# Patient Record
Sex: FEMALE | Race: WHITE | Employment: FULL TIME | URBAN - METROPOLITAN AREA
[De-identification: names, ages, dates, MRNs, and addresses within clinical notes are randomized per-mention and may not be internally consistent; named-entity substitution may affect disease eponyms.]

---

## 2017-01-19 ENCOUNTER — OFFICE VISIT (OUTPATIENT)
Dept: NEPHROLOGY | Age: 37
End: 2017-01-19

## 2017-01-19 VITALS — WEIGHT: 141.8 LBS | DIASTOLIC BLOOD PRESSURE: 78 MMHG | SYSTOLIC BLOOD PRESSURE: 118 MMHG

## 2017-01-19 DIAGNOSIS — E87.6 HYPOKALEMIA: Primary | ICD-10-CM

## 2017-01-19 DIAGNOSIS — E83.42 HYPOMAGNESEMIA: ICD-10-CM

## 2017-01-19 PROCEDURE — 99214 OFFICE O/P EST MOD 30 MIN: CPT | Performed by: INTERNAL MEDICINE

## 2017-01-19 PROCEDURE — G8428 CUR MEDS NOT DOCUMENT: HCPCS | Performed by: INTERNAL MEDICINE

## 2017-01-19 PROCEDURE — G8484 FLU IMMUNIZE NO ADMIN: HCPCS | Performed by: INTERNAL MEDICINE

## 2017-01-19 PROCEDURE — G8599 NO ASA/ANTIPLAT THER USE RNG: HCPCS | Performed by: INTERNAL MEDICINE

## 2017-01-19 PROCEDURE — 1036F TOBACCO NON-USER: CPT | Performed by: INTERNAL MEDICINE

## 2017-01-19 PROCEDURE — G8421 BMI NOT CALCULATED: HCPCS | Performed by: INTERNAL MEDICINE

## 2017-01-19 RX ORDER — PANTOPRAZOLE SODIUM 20 MG/1
40 TABLET, DELAYED RELEASE ORAL 2 TIMES DAILY
COMMUNITY
Start: 2017-01-12 | End: 2018-07-10

## 2017-01-20 ENCOUNTER — TELEPHONE (OUTPATIENT)
Dept: NEPHROLOGY | Age: 37
End: 2017-01-20

## 2017-01-30 ENCOUNTER — TELEPHONE (OUTPATIENT)
Dept: NEPHROLOGY | Age: 37
End: 2017-01-30

## 2017-01-31 LAB
BASOPHILS ABSOLUTE: NORMAL /ΜL
BASOPHILS RELATIVE PERCENT: NORMAL %
BUN BLDV-MCNC: 12 MG/DL
CALCIUM SERPL-MCNC: 8.9 MG/DL
CHLORIDE BLD-SCNC: 104 MMOL/L
CO2: 23 MMOL/L
CREAT SERPL-MCNC: 0.83 MG/DL
EOSINOPHILS ABSOLUTE: NORMAL /ΜL
EOSINOPHILS RELATIVE PERCENT: NORMAL %
GFR CALCULATED: >60
GLUCOSE BLD-MCNC: 97 MG/DL
HCT VFR BLD CALC: 37.4 % (ref 36–46)
HEMOGLOBIN: 12.5 G/DL (ref 12–16)
LYMPHOCYTES ABSOLUTE: NORMAL /ΜL
LYMPHOCYTES RELATIVE PERCENT: NORMAL %
MAGNESIUM: 1.9 MG/DL
MCH RBC QN AUTO: NORMAL PG
MCHC RBC AUTO-ENTMCNC: NORMAL G/DL
MCV RBC AUTO: NORMAL FL
MONOCYTES ABSOLUTE: NORMAL /ΜL
MONOCYTES RELATIVE PERCENT: NORMAL %
NEUTROPHILS ABSOLUTE: NORMAL /ΜL
NEUTROPHILS RELATIVE PERCENT: NORMAL %
PLATELET # BLD: 223 K/ΜL
PMV BLD AUTO: NORMAL FL
POTASSIUM SERPL-SCNC: 3 MMOL/L
RBC # BLD: 4.23 10^6/ΜL
SODIUM BLD-SCNC: 139 MMOL/L
WBC # BLD: 5.9 10^3/ML

## 2017-02-01 ENCOUNTER — TELEPHONE (OUTPATIENT)
Dept: NEPHROLOGY | Age: 37
End: 2017-02-01

## 2017-02-01 DIAGNOSIS — E87.6 HYPOKALEMIA: Primary | ICD-10-CM

## 2017-02-03 LAB — POTASSIUM (K+): 3.6

## 2017-02-06 ENCOUNTER — TELEPHONE (OUTPATIENT)
Dept: NEPHROLOGY | Age: 37
End: 2017-02-06

## 2017-02-06 DIAGNOSIS — E83.42 HYPOMAGNESEMIA: Primary | ICD-10-CM

## 2017-02-06 DIAGNOSIS — E87.6 CHRONIC HYPOKALEMIA: ICD-10-CM

## 2017-02-27 LAB
BUN BLDV-MCNC: 17 MG/DL
CALCIUM SERPL-MCNC: 9.1 MG/DL
CHLORIDE BLD-SCNC: 107 MMOL/L
CO2: 21 MMOL/L
CREAT SERPL-MCNC: 0.67 MG/DL
GFR CALCULATED: 60
GLUCOSE BLD-MCNC: 94 MG/DL
POTASSIUM SERPL-SCNC: 4.7 MMOL/L
SODIUM BLD-SCNC: 138 MMOL/L

## 2017-02-28 DIAGNOSIS — I10 ESSENTIAL HYPERTENSION: ICD-10-CM

## 2017-02-28 DIAGNOSIS — E83.42 HYPOMAGNESEMIA: Primary | ICD-10-CM

## 2017-02-28 DIAGNOSIS — E87.6 HYPOKALEMIA: ICD-10-CM

## 2017-02-28 RX ORDER — SPIRONOLACTONE 25 MG/1
12.5 TABLET ORAL DAILY
Qty: 30 TABLET | Refills: 3 | Status: CANCELLED | OUTPATIENT
Start: 2017-02-28

## 2017-03-06 DIAGNOSIS — I10 ESSENTIAL HYPERTENSION: ICD-10-CM

## 2017-03-06 LAB — POTASSIUM (K+): 4.9

## 2017-03-06 RX ORDER — SPIRONOLACTONE 25 MG/1
TABLET ORAL
Qty: 30 TABLET | Refills: 3 | OUTPATIENT
Start: 2017-03-06 | End: 2018-03-13

## 2017-03-07 ENCOUNTER — TELEPHONE (OUTPATIENT)
Dept: NEPHROLOGY | Age: 37
End: 2017-03-07

## 2017-03-07 DIAGNOSIS — E87.6 HYPOKALEMIA: Primary | ICD-10-CM

## 2017-03-07 LAB
BUN BLDV-MCNC: 12 MG/DL
CALCIUM SERPL-MCNC: 8.4 MG/DL
CHLORIDE BLD-SCNC: 108 MMOL/L
CO2: 23 MMOL/L
CREAT SERPL-MCNC: 0.73 MG/DL
GFR CALCULATED: >60
GLUCOSE BLD-MCNC: 106 MG/DL
MAGNESIUM: 1.8 MG/DL
POTASSIUM SERPL-SCNC: 4.9 MMOL/L
SODIUM BLD-SCNC: 140 MMOL/L

## 2017-03-13 LAB — POTASSIUM (K+): 4.8

## 2017-03-15 ENCOUNTER — TELEPHONE (OUTPATIENT)
Dept: NEPHROLOGY | Age: 37
End: 2017-03-15

## 2017-03-15 DIAGNOSIS — E83.42 HYPOMAGNESEMIA: ICD-10-CM

## 2017-03-15 DIAGNOSIS — E87.6 HYPOKALEMIA: ICD-10-CM

## 2017-03-20 LAB — POTASSIUM (K+): 3.6

## 2017-03-21 ENCOUNTER — TELEPHONE (OUTPATIENT)
Dept: NEPHROLOGY | Age: 37
End: 2017-03-21

## 2017-03-21 DIAGNOSIS — E87.6 HYPOKALEMIA: Primary | ICD-10-CM

## 2017-03-24 LAB
BUN BLDV-MCNC: 19 MG/DL
CALCIUM SERPL-MCNC: 8.7 MG/DL
CHLORIDE BLD-SCNC: 108 MMOL/L
CO2: 23 MMOL/L
CREAT SERPL-MCNC: 0.89 MG/DL
GFR CALCULATED: >60
GLUCOSE BLD-MCNC: 107 MG/DL
MAGNESIUM: 1.9 MG/DL
POTASSIUM SERPL-SCNC: 3.6 MMOL/L
SODIUM BLD-SCNC: 141 MMOL/L

## 2017-03-27 ENCOUNTER — TELEPHONE (OUTPATIENT)
Dept: NEPHROLOGY | Age: 37
End: 2017-03-27

## 2017-03-27 DIAGNOSIS — E87.6 HYPOKALEMIA: ICD-10-CM

## 2017-03-27 DIAGNOSIS — E83.42 HYPOMAGNESEMIA: ICD-10-CM

## 2017-04-07 ENCOUNTER — TELEPHONE (OUTPATIENT)
Dept: NEPHROLOGY | Age: 37
End: 2017-04-07

## 2017-04-07 LAB
BUN BLDV-MCNC: 17 MG/DL
CALCIUM SERPL-MCNC: 8.9 MG/DL
CHLORIDE BLD-SCNC: 105 MMOL/L
CO2: 21 MMOL/L
CREAT SERPL-MCNC: 0.89 MG/DL
GFR CALCULATED: >60
GLUCOSE BLD-MCNC: 144 MG/DL
MAGNESIUM: 1.9 MG/DL
POTASSIUM SERPL-SCNC: 3.4 MMOL/L
SODIUM BLD-SCNC: 141 MMOL/L

## 2017-04-10 ENCOUNTER — TELEPHONE (OUTPATIENT)
Dept: NEPHROLOGY | Age: 37
End: 2017-04-10

## 2017-04-10 DIAGNOSIS — E83.42 HYPOMAGNESEMIA: Primary | ICD-10-CM

## 2017-04-10 DIAGNOSIS — E87.6 HYPOKALEMIA: ICD-10-CM

## 2017-04-10 RX ORDER — SERTRALINE HYDROCHLORIDE 100 MG/1
100 TABLET, FILM COATED ORAL DAILY
COMMUNITY
End: 2017-11-14 | Stop reason: ALTCHOICE

## 2017-04-14 LAB — POTASSIUM (K+): 3.6

## 2017-04-20 ENCOUNTER — OFFICE VISIT (OUTPATIENT)
Dept: NEPHROLOGY | Age: 37
End: 2017-04-20

## 2017-04-20 VITALS
WEIGHT: 173.2 LBS | SYSTOLIC BLOOD PRESSURE: 122 MMHG | DIASTOLIC BLOOD PRESSURE: 78 MMHG | HEART RATE: 94 BPM | OXYGEN SATURATION: 96 %

## 2017-04-20 DIAGNOSIS — E87.6 HYPOKALEMIA: Primary | ICD-10-CM

## 2017-04-20 DIAGNOSIS — E83.42 HYPOMAGNESEMIA: ICD-10-CM

## 2017-04-20 DIAGNOSIS — R19.7 DIARRHEA, UNSPECIFIED TYPE: ICD-10-CM

## 2017-04-20 PROBLEM — G71.3: Status: ACTIVE | Noted: 2017-04-20

## 2017-04-20 PROCEDURE — G8421 BMI NOT CALCULATED: HCPCS | Performed by: INTERNAL MEDICINE

## 2017-04-20 PROCEDURE — G8599 NO ASA/ANTIPLAT THER USE RNG: HCPCS | Performed by: INTERNAL MEDICINE

## 2017-04-20 PROCEDURE — 99213 OFFICE O/P EST LOW 20 MIN: CPT | Performed by: INTERNAL MEDICINE

## 2017-04-20 PROCEDURE — G8428 CUR MEDS NOT DOCUMENT: HCPCS | Performed by: INTERNAL MEDICINE

## 2017-04-20 PROCEDURE — 1036F TOBACCO NON-USER: CPT | Performed by: INTERNAL MEDICINE

## 2017-04-26 ENCOUNTER — TELEPHONE (OUTPATIENT)
Dept: NEPHROLOGY | Age: 37
End: 2017-04-26

## 2017-06-01 ENCOUNTER — TELEPHONE (OUTPATIENT)
Dept: NEPHROLOGY | Age: 37
End: 2017-06-01

## 2017-06-15 LAB
BUN BLDV-MCNC: 13 MG/DL
CALCIUM SERPL-MCNC: 8.8 MG/DL
CHLORIDE BLD-SCNC: 108 MMOL/L
CO2: 23 MMOL/L
CREAT SERPL-MCNC: 0.76 MG/DL
GFR CALCULATED: >60
GLUCOSE BLD-MCNC: 90 MG/DL
MAGNESIUM: 2 MG/DL
POTASSIUM SERPL-SCNC: 3.7 MMOL/L
SODIUM BLD-SCNC: 140 MMOL/L

## 2017-08-09 ENCOUNTER — OFFICE VISIT (OUTPATIENT)
Dept: NEPHROLOGY | Age: 37
End: 2017-08-09
Payer: MEDICARE

## 2017-08-09 VITALS
OXYGEN SATURATION: 94 % | SYSTOLIC BLOOD PRESSURE: 114 MMHG | WEIGHT: 180 LBS | DIASTOLIC BLOOD PRESSURE: 72 MMHG | HEART RATE: 123 BPM

## 2017-08-09 DIAGNOSIS — E83.42 HYPOMAGNESEMIA: ICD-10-CM

## 2017-08-09 DIAGNOSIS — E87.6 HYPOKALEMIA: Primary | ICD-10-CM

## 2017-08-09 LAB
BUN BLDV-MCNC: 13 MG/DL
CALCIUM SERPL-MCNC: 8.7 MG/DL
CHLORIDE BLD-SCNC: 105 MMOL/L
CO2: 19 MMOL/L
CREAT SERPL-MCNC: 0.98 MG/DL
GFR CALCULATED: >60
GLUCOSE BLD-MCNC: 144 MG/DL
MAGNESIUM: 1.8 MG/DL
PHOSPHORUS: 1.9 MG/DL
POTASSIUM SERPL-SCNC: 3.9 MMOL/L
SODIUM BLD-SCNC: 135 MMOL/L

## 2017-08-09 PROCEDURE — 99213 OFFICE O/P EST LOW 20 MIN: CPT | Performed by: INTERNAL MEDICINE

## 2017-08-09 PROCEDURE — G8599 NO ASA/ANTIPLAT THER USE RNG: HCPCS | Performed by: INTERNAL MEDICINE

## 2017-08-09 PROCEDURE — 1036F TOBACCO NON-USER: CPT | Performed by: INTERNAL MEDICINE

## 2017-08-09 PROCEDURE — G8421 BMI NOT CALCULATED: HCPCS | Performed by: INTERNAL MEDICINE

## 2017-08-09 PROCEDURE — G8427 DOCREV CUR MEDS BY ELIG CLIN: HCPCS | Performed by: INTERNAL MEDICINE

## 2017-08-09 RX ORDER — CLONAZEPAM 0.5 MG/1
0.5 TABLET ORAL 3 TIMES DAILY PRN
COMMUNITY
Start: 2017-07-18

## 2017-08-09 RX ORDER — SULFAMETHOXAZOLE AND TRIMETHOPRIM 800; 160 MG/1; MG/1
1 TABLET ORAL DAILY PRN
COMMUNITY
Start: 2017-07-21 | End: 2018-07-10 | Stop reason: ALTCHOICE

## 2017-08-09 RX ORDER — PREDNISONE 20 MG/1
60 TABLET ORAL
COMMUNITY
Start: 2017-07-18 | End: 2017-11-14 | Stop reason: CLARIF

## 2017-08-09 RX ORDER — DULOXETIN HYDROCHLORIDE 60 MG/1
60 CAPSULE, DELAYED RELEASE ORAL 2 TIMES DAILY
COMMUNITY

## 2017-08-10 ENCOUNTER — TELEPHONE (OUTPATIENT)
Dept: NEPHROLOGY | Age: 37
End: 2017-08-10

## 2017-08-16 ENCOUNTER — TELEPHONE (OUTPATIENT)
Dept: NEPHROLOGY | Age: 37
End: 2017-08-16

## 2017-08-16 DIAGNOSIS — E87.6 HYPOKALEMIA: Primary | ICD-10-CM

## 2017-08-25 ENCOUNTER — TELEPHONE (OUTPATIENT)
Dept: NEPHROLOGY | Age: 37
End: 2017-08-25

## 2017-10-20 LAB
BUN BLDV-MCNC: 20 MG/DL
CALCIUM SERPL-MCNC: 8.9 MG/DL
CHLORIDE BLD-SCNC: 108 MMOL/L
CO2: 22 MMOL/L
CREAT SERPL-MCNC: 0.81 MG/DL
GFR CALCULATED: >60
GLUCOSE BLD-MCNC: 92 MG/DL
POTASSIUM SERPL-SCNC: 4 MMOL/L
SODIUM BLD-SCNC: 141 MMOL/L

## 2017-10-23 ENCOUNTER — TELEPHONE (OUTPATIENT)
Dept: NEPHROLOGY | Age: 37
End: 2017-10-23

## 2017-10-23 NOTE — TELEPHONE ENCOUNTER
----- Message from Manuela Villarreal MD sent at 10/20/2017  5:37 PM EDT -----  How much K and Mg is she on?

## 2017-11-14 ENCOUNTER — OFFICE VISIT (OUTPATIENT)
Dept: NEPHROLOGY | Age: 37
End: 2017-11-14
Payer: MEDICARE

## 2017-11-14 VITALS
SYSTOLIC BLOOD PRESSURE: 124 MMHG | DIASTOLIC BLOOD PRESSURE: 78 MMHG | WEIGHT: 186 LBS | HEART RATE: 87 BPM | OXYGEN SATURATION: 99 %

## 2017-11-14 DIAGNOSIS — E83.42 HYPOMAGNESEMIA: ICD-10-CM

## 2017-11-14 DIAGNOSIS — E87.6 HYPOKALEMIA: Primary | ICD-10-CM

## 2017-11-14 PROCEDURE — 99213 OFFICE O/P EST LOW 20 MIN: CPT | Performed by: INTERNAL MEDICINE

## 2017-11-14 PROCEDURE — G8484 FLU IMMUNIZE NO ADMIN: HCPCS | Performed by: INTERNAL MEDICINE

## 2017-11-14 PROCEDURE — G8421 BMI NOT CALCULATED: HCPCS | Performed by: INTERNAL MEDICINE

## 2017-11-14 PROCEDURE — G8428 CUR MEDS NOT DOCUMENT: HCPCS | Performed by: INTERNAL MEDICINE

## 2017-11-14 PROCEDURE — G8599 NO ASA/ANTIPLAT THER USE RNG: HCPCS | Performed by: INTERNAL MEDICINE

## 2017-11-14 PROCEDURE — 1036F TOBACCO NON-USER: CPT | Performed by: INTERNAL MEDICINE

## 2017-11-14 RX ORDER — POTASSIUM CHLORIDE 20 MEQ/1
20 TABLET, EXTENDED RELEASE ORAL 4 TIMES DAILY
Qty: 120 TABLET | Refills: 3
Start: 2017-11-14

## 2017-11-14 RX ORDER — LORAZEPAM 1 MG/1
1 TABLET ORAL
COMMUNITY

## 2017-11-14 NOTE — PROGRESS NOTES
[facility]    [facility], [address]   [facility], [address]  [phone]  Progress Note  11/14/2017 9:37 AM      Pt Name:    Padmini Post  YOB: 1980  Primary Care Physician:  Nita Bowen DO     Chief Complaint:   Chief Complaint   Patient presents with    Follow-up     Hypokalemia        Background Information/Interval History:   The patient is a 40 y.o. white female who is here for follow-up visit. She was initially referred to us for evaluation and management of electrolyte imbalance. Patient has extensive and very complex medical history including Dajuan-Danlos syndrome (EDS), Possible POTS, pancreatic insufficiency, chronic diarrhea, pulmonary embolism, hx Hashimoto thyroiditis, SVT, hx cardiac arrest, hx anxiety/depression multiple allergies to medications as listed, hx urinary retention, neurogenic bladder, hx COPD, CVA/TIA, migraines, seizures, Hx GIB, colitis, C. Diff, Rectal prolapse, UTI, bladder prolapse, Spinal stenosis, Prolapsed rectum, hx post-partum depression/anxiety, previous panic attacks and PTSD related to previous hospital stays and admissions. Patient has had extensive evaluation previously at [facility]. She reports multiple bowel movements through the day. She reported that she has previously followed with multiple specialities both at [facility] and  including nephrology. She has chronic low potassium and has required VERY high doses of K replacements. She reports having chronic nausea, vomiting and diarrhea. Sometimes will have upto 10-12 bowel movements. She does not take any diuretics. She has been on  sodium chloride tablets 1 gram po BID chronically. She also takes magnesium oxide 250 mg po 2 tablets a day.       She is here for 3 months follow-up. She says she feels well. No complaints offered by patient. She says she is taking K-dur 20 meq po four times a day for a total of 80 meq po daily.  She is taking mag oxide 250 mg po BID. Pt says she is now on \"liquid/protein shake\" and she thinks this is helping her. She says she is eating better. She reports she does not have as many BMs anymore. She says she is down to about 4 bowel movements a day. She reports she has not needed to perform any self-catheterization (for neurogenic bladder) for almost a month now. She says she only takes bactrim if she does self cath several days in a row. She is also taking aldactone 25 mg po daily. VITALS:  /78 (Site: Right Arm, Position: Sitting, Cuff Size: Large Adult)   Pulse 87   Wt 186 lb (84.4 kg)   SpO2 99%   Wt Readings from Last 3 Encounters:   11/14/17 186 lb (84.4 kg)   08/09/17 180 lb (81.6 kg)   04/20/17 173 lb 3.2 oz (78.6 kg)     There is no height or weight on file to calculate BMI. General Appearance: alert and cooperative with exam, appears comfortable, no distress  Oral: moist oral mucus membranes  Neck: No jugular venous distention  Lungs: Air entry B/L, no crackles or rales, no use of accessory muscles  Heart: S1, S2 heard  GI: soft, non-tender, no guarding  Extremities: No sig LE edema     Medications:  Current Outpatient Prescriptions   Medication Sig Dispense Refill    LORazepam (ATIVAN) 1 MG tablet Take 1 mg by mouth .  sulfamethoxazole-trimethoprim (BACTRIM DS;SEPTRA DS) 800-160 MG per tablet Take 1 tablet by mouth daily as needed       clonazePAM (KLONOPIN) 0.5 MG tablet Take 0.5 mg by mouth 3 times daily as needed  .       DULoxetine (CYMBALTA) 60 MG extended release capsule Take 60 mg by mouth 2 times daily       spironolactone (ALDACTONE) 25 MG tablet 1 tablet by mouth daily 30 tablet 3    pantoprazole (PROTONIX) 20 MG tablet Take 20 mg by mouth 2 times daily       ergocalciferol (DRISDOL) 8000 UNIT/ML drops Take 50,000 Units by mouth once a week       HYDROcodone-acetaminophen (NORCO) 5-325 MG per tablet Take 1 tablet by mouth every 6 hours as needed       LACTOBACILLUS PO Take 1 tablet by mouth daily       lidocaine (LIDODERM) 5 % Apply 1 patch topically daily as needed       potassium chloride (KLOR-CON M) 20 MEQ extended release tablet Take 40 mEq by mouth 4 times daily       oxyCODONE-acetaminophen (PERCOCET) 5-325 MG per tablet Take 1-2 tablets by mouth every 4 hours as needed       colestipol (COLESTID) 1 G tablet Take 1 g by mouth as needed       zolpidem (AMBIEN) 5 MG tablet Take 5 mg by mouth nightly as needed       topiramate (TOPAMAX) 100 MG tablet Take 200 mg by mouth nightly       sodium chloride 1 G tablet Take 1 tablet by mouth 2 times daily       promethazine (PHENERGAN) 25 MG suppository Place 1 suppository rectally every 4 hours as needed       prochlorperazine (COMPAZINE) 10 MG tablet Take 1 tablet by mouth every 6 hours as needed       albuterol sulfate  (90 BASE) MCG/ACT inhaler Take 2 Puffs by inhalation as directed.  lipase-protease-amylase (CREON) 6000 UNITS delayed release capsule Take 3 capsules by mouth 3 times daily (with meals) 60,000      Ascorbic Acid (VITAMIN C) 500 MG tablet Take 1 Tab (500 mg total) by mouth 1 time a day.  bisacodyl (DULCOLAX) 5 MG EC tablet Take 5 mg by mouth 1 time a day. PRN      cholestyramine light 4 G packet Take by mouth 3 times a day with meals. If patient eats      Coenzyme Q10 100 MG TABS Take 2 Tabs (200 mg total) by mouth 2 times a day.  cyanocobalamin 1000 MCG/ML injection Inject 1,000 mcg every month.  cyclobenzaprine (FLEXERIL) 10 MG tablet Take 10 mg by mouth 2 times a day. prn      fluticasone-salmeterol (ADVAIR) 250-50 MCG/DOSE AEPB Take 2 Puffs by inhalation 2 times a day.  levOCARNitine (CARNITOR) 330 MG tablet Take 2 Tabs (660 mg total) by mouth 1 time a day.  hydrOXYzine (ATARAX) 50 MG tablet Take 100 mg by mouth at bedtime. every 6 hours PRN      LORazepam (ATIVAN) 0.5 MG tablet Take 1 mg by mouth every 8 hours.   PRN      Pyridoxine HCl (VITAMIN B-6) 50 MG tablet 0.89     June 2017: K 3.7, creat 0.76  July 24 2017 ([facility] labs): K 2.8  Oct 2017: Na 141, Creat 0.8, K 4.0     Impression/Plan:   1. Hypokalemia: secondary to GI losses from chronic diarrhea. Previous workup showed random urine potassium of 15 and this is appropriate renal response. Previous work-up including 24 hr urine potassium did NOT suggest renal potassium loss. Again, I had previously advised second opinion/referral/evaluation at tertiary academic center like [facility],  or [address] [place] [address] but she declined. She still has several bowel movements a day although not nearly as much anymore. She is following with GI every year at [address] [place] [address] but last available note on [email or web address] is from March 2016. Recent K level is within acceptable range. She is currently on K-dur 20 meq po 4 times per day.      2. Hx Dajuan Danlos Syndrome  3. Chronic diarrhea: advised strongly to follow with GI at [address] [place] [address]. She was supposed to see her GI physician in June 2016 but last office visit available on [employer] is from March 2016.   4. Pancreatic insufficiency  5. Neurogenic bladder  6. Hx prolapse of female pelvic organs   7. Hypomagnesemia: on mag oxide 250 mg po BID. Check Mg level    D/W pt and family member. All questions answered. Strongly advised her to follow-up with [address] [place] [address] physician for her GI follow-up. Orders Placed This Encounter   Procedures    Phosphorus    Magnesium    Basic Metabolic Panel     Return in about 4 months (around 3/14/2018).     Nava Viera MD  [facility]

## 2017-11-17 ENCOUNTER — TELEPHONE (OUTPATIENT)
Dept: NEPHROLOGY | Age: 37
End: 2017-11-17

## 2017-11-17 DIAGNOSIS — E83.42 HYPOMAGNESEMIA: ICD-10-CM

## 2017-11-17 DIAGNOSIS — E87.6 CHRONIC HYPOKALEMIA: Primary | ICD-10-CM

## 2017-11-17 LAB
BUN BLDV-MCNC: 17 MG/DL
CALCIUM SERPL-MCNC: 9 MG/DL
CHLORIDE BLD-SCNC: 104 MMOL/L
CO2: 27 MMOL/L
CREAT SERPL-MCNC: 0.72 MG/DL
GFR CALCULATED: >60
GLUCOSE BLD-MCNC: 110 MG/DL
MAGNESIUM: 1.7 MG/DL
PHOSPHORUS: 3.3 MG/DL
POTASSIUM SERPL-SCNC: 3.6 MMOL/L
SODIUM BLD-SCNC: 140 MMOL/L

## 2017-11-17 RX ORDER — MAGNESIUM OXIDE 400 MG/1
400 TABLET ORAL 2 TIMES DAILY
COMMUNITY
End: 2017-11-17 | Stop reason: SDUPTHER

## 2017-11-17 RX ORDER — MAGNESIUM OXIDE 400 MG/1
400 TABLET ORAL 2 TIMES DAILY
Qty: 30 TABLET | Refills: 3 | Status: SHIPPED | OUTPATIENT
Start: 2017-11-17 | End: 2018-07-10

## 2017-11-17 NOTE — TELEPHONE ENCOUNTER
----- Message from Edel aBrreto MD sent at 11/17/2017  2:59 PM EST -----  Stop Mag oxide 250 mg po BID  Start Magnesium oxide 400 mg po BID    She is currently taking K-dur 20 meq po  FOUR times per day (total of 80 meq po per day)  Please ask her to increase K-dur 40 meq po breakfast, 40 meq po lunch and 20 meq po dinner (NEW dose of total 100 meq per day)    Repeat BMP and Magnesium level 1 week.

## 2017-12-08 LAB
BUN BLDV-MCNC: 15 MG/DL
CALCIUM SERPL-MCNC: 9.3 MG/DL
CHLORIDE BLD-SCNC: 102 MMOL/L
CO2: 25 MMOL/L
CREAT SERPL-MCNC: 0.73 MG/DL
GFR CALCULATED: >60
GLUCOSE BLD-MCNC: 108 MG/DL
MAGNESIUM: 2 MG/DL
POTASSIUM SERPL-SCNC: 3.6 MMOL/L
SODIUM BLD-SCNC: 137 MMOL/L

## 2018-01-12 LAB
BUN BLDV-MCNC: 16 MG/DL
CALCIUM SERPL-MCNC: 8.9 MG/DL
CHLORIDE BLD-SCNC: 106 MMOL/L
CO2: 24 MMOL/L
CREAT SERPL-MCNC: 0.86 MG/DL
GFR CALCULATED: >60
GLUCOSE BLD-MCNC: 119 MG/DL
MAGNESIUM: 1.9 MG/DL
POTASSIUM SERPL-SCNC: 3.9 MMOL/L
SODIUM BLD-SCNC: 142 MMOL/L

## 2018-01-15 ENCOUNTER — TELEPHONE (OUTPATIENT)
Dept: NEPHROLOGY | Age: 38
End: 2018-01-15

## 2018-01-15 DIAGNOSIS — E83.42 HYPOMAGNESEMIA: ICD-10-CM

## 2018-01-15 DIAGNOSIS — E87.6 HYPOKALEMIA: ICD-10-CM

## 2018-01-15 NOTE — TELEPHONE ENCOUNTER
Patient called-I told her the K and Mg looked good-I told her that we would send the orders for the BMP and Mg through the mail to her

## 2018-02-22 LAB
BUN BLDV-MCNC: 17 MG/DL
CALCIUM SERPL-MCNC: 8.5 MG/DL
CHLORIDE BLD-SCNC: 106 MMOL/L
CO2: 22 MMOL/L
CREAT SERPL-MCNC: 0.83 MG/DL
GFR CALCULATED: 60
GLUCOSE BLD-MCNC: 119 MG/DL
MAGNESIUM: 1.9 MG/DL
POTASSIUM SERPL-SCNC: 3.5 MMOL/L
SODIUM BLD-SCNC: 137 MMOL/L

## 2018-03-13 ENCOUNTER — OFFICE VISIT (OUTPATIENT)
Dept: NEPHROLOGY | Age: 38
End: 2018-03-13
Payer: MEDICARE

## 2018-03-13 VITALS
SYSTOLIC BLOOD PRESSURE: 122 MMHG | OXYGEN SATURATION: 100 % | DIASTOLIC BLOOD PRESSURE: 86 MMHG | WEIGHT: 193.8 LBS | HEART RATE: 96 BPM

## 2018-03-13 DIAGNOSIS — E83.42 HYPOMAGNESEMIA: ICD-10-CM

## 2018-03-13 DIAGNOSIS — E87.6 HYPOKALEMIA: Primary | ICD-10-CM

## 2018-03-13 PROCEDURE — 1036F TOBACCO NON-USER: CPT | Performed by: INTERNAL MEDICINE

## 2018-03-13 PROCEDURE — G8599 NO ASA/ANTIPLAT THER USE RNG: HCPCS | Performed by: INTERNAL MEDICINE

## 2018-03-13 PROCEDURE — 99213 OFFICE O/P EST LOW 20 MIN: CPT | Performed by: INTERNAL MEDICINE

## 2018-03-13 PROCEDURE — G8484 FLU IMMUNIZE NO ADMIN: HCPCS | Performed by: INTERNAL MEDICINE

## 2018-03-13 PROCEDURE — G8421 BMI NOT CALCULATED: HCPCS | Performed by: INTERNAL MEDICINE

## 2018-03-13 PROCEDURE — G8428 CUR MEDS NOT DOCUMENT: HCPCS | Performed by: INTERNAL MEDICINE

## 2018-03-13 RX ORDER — SPIRONOLACTONE 25 MG/1
TABLET ORAL
Qty: 30 TABLET | Refills: 3 | Status: CANCELLED | OUTPATIENT
Start: 2018-03-13

## 2018-03-13 NOTE — PROGRESS NOTES
tablet Take 100 mg by mouth at bedtime. every 6 hours PRN      LORazepam (ATIVAN) 0.5 MG tablet Take 1 mg by mouth every 8 hours. PRN      Pyridoxine HCl (VITAMIN B-6) 50 MG tablet Take 50 mg by mouth 1 time a day.  vitamin B-2 (RIBOFLAVIN) 100 MG TABS tablet Take 1 Tab (100 mg total) by mouth 1 time a day.  vitamin B-1 (THIAMINE) 100 MG tablet Take 100 mg by mouth 1 time a day.  vitamin E 1000 UNITS capsule Take 1 Cap (1,000 Units total) by mouth 1 time a day.  albuterol (ACCUNEB) 1.25 MG/3ML nebulizer solution Inhale 1 ampule into the lungs as needed       calcium carbonate 1250 (500 CA) MG capsule Take 1,200 mg by mouth 2 times daily (with meals)       Catheters MISC Use for ISC every four hours as needed.  NONFORMULARY Meloxi/Doxep/Amanta/DXM/Lidocaube (25)0.5/3/3/2/2% CR. Aplly 1-2 grams four times daily as needed      EPINEPHrine 1 MG/ML SOLN injection by INJECTION(UNSPECIFIED PARENTERAL ROUTES) route as needed.  ferrous sulfate (SARA-RUSH) 325 (65 FE) MG tablet Take 325 mg by mouth 2 times daily       Heparin & NaCl Lock Flush 100-0.9 UNIT/ML-% KIT Infuse intravenously as needed       Propylene Glycol (SYSTANE BALANCE) 0.6 % SOLN Use 2 Drops in both eyes as needed.  B Complex-Biotin-FA (HM VITAMIN B100 COMPLEX PO) Take 1 tablet by mouth      white petrolatum (VASELINE) GEL Apply topically as needed       loratadine (CLARITIN) 10 MG tablet Take 10 mg by mouth daily WITH SEASONAL ALLERGY       No current facility-administered medications for this visit.          Laboratory & Diagnostics:  July 2016: Mag 1.6, K 3.0, Ca 7.1, PTH 37  Aug 2016: Mg 1.7, K 3.3  Sept 2016: UA(-) blood, (-) glucose, (-) protein, Mg 2.1, K 3.1, calcium 8.5, urine K 15 (appropriately low urine K)  Aldosterone 6, Renin 1.92, PTH 36, uric acid 3.7, phos 2.8, Hb 13.6, UPCR <200 mg/g  Sept 28: K 3.3, Creat 0.7, bicarb 23, ca 8.5  April 2017: K 3.4 to 3.6, Creat 0.89     June 2017: K 3.7, creat

## 2018-07-05 LAB
BUN BLDV-MCNC: 12 MG/DL
CALCIUM SERPL-MCNC: 8.5 MG/DL
CHLORIDE BLD-SCNC: 109 MMOL/L
CO2: 21 MMOL/L
CREAT SERPL-MCNC: 1.06 MG/DL
GFR CALCULATED: 58
GLUCOSE BLD-MCNC: 109 MG/DL
MAGNESIUM: 1.6 MG/DL
POTASSIUM SERPL-SCNC: 3.1 MMOL/L
SODIUM BLD-SCNC: 143 MMOL/L

## 2018-07-06 ENCOUNTER — TELEPHONE (OUTPATIENT)
Dept: NEPHROLOGY | Age: 38
End: 2018-07-06

## 2018-07-06 NOTE — TELEPHONE ENCOUNTER
----- Message from Corina Terrazas MD sent at 7/6/2018 10:23 AM EDT -----  Pls call and check how much K is she taking?

## 2018-07-10 ENCOUNTER — OFFICE VISIT (OUTPATIENT)
Dept: NEPHROLOGY | Age: 38
End: 2018-07-10
Payer: MEDICARE

## 2018-07-10 VITALS
DIASTOLIC BLOOD PRESSURE: 80 MMHG | OXYGEN SATURATION: 98 % | WEIGHT: 195 LBS | SYSTOLIC BLOOD PRESSURE: 120 MMHG | HEART RATE: 90 BPM

## 2018-07-10 DIAGNOSIS — E87.6 HYPOKALEMIA, GASTROINTESTINAL LOSSES: Primary | ICD-10-CM

## 2018-07-10 DIAGNOSIS — E83.42 HYPOMAGNESEMIA: ICD-10-CM

## 2018-07-10 LAB
BUN BLDV-MCNC: 10 MG/DL
CALCIUM SERPL-MCNC: 9.3 MG/DL
CHLORIDE BLD-SCNC: 103 MMOL/L
CO2: 19 MMOL/L
CREAT SERPL-MCNC: 0.6 MG/DL
GFR CALCULATED: >60
GLUCOSE BLD-MCNC: 92 MG/DL
MAGNESIUM: 2.1 MG/DL
PHOSPHORUS: 3.1 MG/DL
POTASSIUM SERPL-SCNC: 3.9 MMOL/L
SODIUM BLD-SCNC: 139 MMOL/L

## 2018-07-10 PROCEDURE — 99214 OFFICE O/P EST MOD 30 MIN: CPT | Performed by: INTERNAL MEDICINE

## 2018-07-10 PROCEDURE — 1036F TOBACCO NON-USER: CPT | Performed by: INTERNAL MEDICINE

## 2018-07-10 PROCEDURE — G8421 BMI NOT CALCULATED: HCPCS | Performed by: INTERNAL MEDICINE

## 2018-07-10 PROCEDURE — G8428 CUR MEDS NOT DOCUMENT: HCPCS | Performed by: INTERNAL MEDICINE

## 2018-07-10 PROCEDURE — G8599 NO ASA/ANTIPLAT THER USE RNG: HCPCS | Performed by: INTERNAL MEDICINE

## 2018-07-10 RX ORDER — FAMOTIDINE 20 MG/1
20 TABLET, FILM COATED ORAL 2 TIMES DAILY
Qty: 60 TABLET | Refills: 3 | Status: SHIPPED | OUTPATIENT
Start: 2018-07-10

## 2018-07-10 RX ORDER — MAGNESIUM CHLORIDE 64 MG
1 TABLET, DELAYED RELEASE (ENTERIC COATED) ORAL
Qty: 90 TABLET | Refills: 3 | Status: SHIPPED | OUTPATIENT
Start: 2018-07-10

## 2018-07-10 NOTE — PROGRESS NOTES
[facility]    [address]   [place]  [phone]  Progress Note  7/10/2018 8:51 AM      Pt Name:    Elmer Garcia  YOB: 1980  Primary Care Physician:  Senait Solares DO     Chief Complaint:   Chief Complaint   Patient presents with    Follow-up     Hypokalemia        Background Information/Interval History:   The patient is a 37 y. o. white female who is here for follow-up visit. She was initially referred to us for evaluation and management of electrolyte imbalance. Patient has extensive and very complex medical history including Dajuan-Danlos syndrome (EDS), Possible POTS, pancreatic insufficiency, chronic diarrhea, pulmonary embolism, hx Hashimoto thyroiditis, SVT, hx cardiac arrest, hx anxiety/depression multiple allergies to medications as listed, hx urinary retention, neurogenic bladder, hx COPD, CVA/TIA, migraines, seizures, Hx GIB, colitis, C. Diff, Rectal prolapse, UTI, bladder prolapse, Spinal stenosis, Prolapsed rectum, hx post-partum depression/anxiety, previous panic attacks and PTSD related to previous hospital stays and admissions. Patient has had extensive evaluation previously at [facility]. She reports multiple bowel movements through the day. She reported that she has previously followed with multiple specialities both at [facility] and  including nephrology. She has chronic low potassium and has required VERY high doses of K replacements. She reports having chronic nausea, vomiting and diarrhea. She does not take any diuretics. She has been on  sodium chloride tablets 1 gram po BID chronically. She is here for 4 months follow-up. She is frustrated about having so many bowel movements. She is tearful. Reports she is constantly nauseated. She reports 10-12 bowel movements per day yet she refuses to see GI specialist at tertiary center.  This has been discussed several times in the past.  I've reviewed her primary physician's (Dr. Odalis Diaz) note. No leg swelling. She is taking Klor-Con 80 meq po four times per day. Past History:  Past Medical History:   Diagnosis Date    Hospitalization within last 30 days 06/2018    [facility]     History reviewed. No pertinent surgical history. VITALS:  /80 (Site: Left Arm, Position: Sitting, Cuff Size: Large Adult)   Pulse 90   Wt 195 lb (88.5 kg)   SpO2 98%   Wt Readings from Last 3 Encounters:   07/10/18 195 lb (88.5 kg)   03/13/18 193 lb 12.8 oz (87.9 kg)   11/14/17 186 lb (84.4 kg)     There is no height or weight on file to calculate BMI. General Appearance: alert and cooperative with exam, appears comfortable, no distress  Oral: moist oral mucus membranes  Neck: No jugular venous distention  Lungs: Air entry B/L, no crackles or rales, no use of accessory muscles  Heart: S1, S2 heard  GI: soft, non-tender, no guarding  Extremities: No sig LE edema     Medications:  Current Outpatient Prescriptions   Medication Sig Dispense Refill    magnesium oxide (MAG-OX) 400 MG tablet Take 1 tablet by mouth 2 times daily 30 tablet 3    LORazepam (ATIVAN) 1 MG tablet Take 1 mg by mouth .  potassium chloride (KLOR-CON M) 20 MEQ extended release tablet Take 1 tablet by mouth 4 times daily (Patient taking differently: Take 80 mEq by mouth 4 times daily ) 120 tablet 3    clonazePAM (KLONOPIN) 0.5 MG tablet Take 0.5 mg by mouth 3 times daily as needed  .       pantoprazole (PROTONIX) 20 MG tablet Take 40 mg by mouth 2 times daily 40 MG AM 20 MG HS      ergocalciferol (DRISDOL) 8000 UNIT/ML drops Take 50,000 Units by mouth once a week       LACTOBACILLUS PO Take 1 tablet by mouth daily       lidocaine (LIDODERM) 5 % Apply 1 patch topically daily as needed       colestipol (COLESTID) 1 G tablet Take 1 g by mouth as needed       zolpidem (AMBIEN) 5 MG tablet Take 5 mg by mouth nightly as needed       topiramate (TOPAMAX) 100 MG tablet Take 200 100-0.9 UNIT/ML-% KIT Infuse intravenously as needed       Propylene Glycol (SYSTANE BALANCE) 0.6 % SOLN Use 2 Drops in both eyes as needed.  B Complex-Biotin-FA (HM VITAMIN B100 COMPLEX PO) Take 1 tablet by mouth      loratadine (CLARITIN) 10 MG tablet Take 10 mg by mouth daily WITH SEASONAL ALLERGY      DULoxetine (CYMBALTA) 60 MG extended release capsule Take 60 mg by mouth 2 times daily        No current facility-administered medications for this visit. Laboratory & Diagnostics:  July 2016: Mag 1.6, K 3.0, Ca 7.1, PTH 37  Aug 2016: Mg 1.7, K 3.3  Sept 2016: UA(-) blood, (-) glucose, (-) protein, Mg 2.1, K 3.1, calcium 8.5, urine K 15 (appropriately low urine K)  Aldosterone 6, Renin 1.92, PTH 36, uric acid 3.7, phos 2.8, Hb 13.6, UPCR <200 mg/g  Sept 28: K 3.3, Creat 0.7, bicarb 23, ca 8.5  April 2017: K 3.4 to 3.6, Creat 0.89     June 2017: K 3.7, creat 0.76  July 24 2017 ([facility] labs): K 2.8  Oct 2017: Na 141, Creat 0.8, K 4.0  Feb 2018: K 3.5  July 2018; Na 143, Mg 1.6, K 3.1     Impression/Plan:   1. Hypokalemia: secondary to GI losses from chronic diarrhea. Previous workup showed random urine potassium of 15 and this is appropriate renal response. Previous work-up including 24 hr urine potassium did NOT suggest renal potassium loss. She appears very frustrated and tearful. She has complicated medical history and I agree with Dr. Michael Bolanos there is possible psychosocial issues making this worse. She is on high dose of Potassium replacement. She continues to have significant GI issues. On a best day, she reports about 8 bowel movements, and on a worst day she reports bowel movements upto 12+ times. She already has port in place and IV replacement of potassium has also been discussed. She has been reluctant to go for IV replacement. Again, today she is not interested but she says she will think about it. Previously she had declined evaluation at tertiary care center but today she agreed. She prefers to see someone at [facility]. I strongly recommended and agree with her primary physician she should see GI specialist and should also see nephrology for second opinion. 2. Hypomagnesemia: Will stop Magnesium oxide and try magnesium chloride (Mag delay). She is also taking Protonix due to her GI issues and reflux. I've advised pepcid and she agrees to do this.     3. Hx Dajuan Danlos Syndrome  4. Chronic diarrhea: see GI specialist  5. Pancreatic insufficiency  6. Neurogenic bladder  7. Hx prolapse of female pelvic organs     Again, I am recommending her to see GI and nephrology at tertiary center. Repeat Electrolytes today. I've also offered her IV replacement of electrolytes but she is not interested at this time. She agreed to do labs today. I will send my note to PCP to keep her informed. Had a very long conversation with her. She was tearful. Counseling provided. Total floor time spent examining patient, reviewing chart, placing orders, documentation greater than 30 min. More than half of this time spent directly at bedside in room counseling patient face-to-face. Orders Placed This Encounter   Procedures    Magnesium    Basic Metabolic Panel    Phosphorus    External Referral To Nephrology     Return in about 2 months (around 9/10/2018).     Hadassah Primrose, MD  [facility]

## 2018-07-11 ENCOUNTER — TELEPHONE (OUTPATIENT)
Dept: NEPHROLOGY | Age: 38
End: 2018-07-11

## 2018-07-17 ENCOUNTER — TELEPHONE (OUTPATIENT)
Dept: NEPHROLOGY | Age: 38
End: 2018-07-17

## 2018-07-17 NOTE — PROGRESS NOTES
Please check status of referral  Has she scheduled appt with [facility] nephrology?

## 2018-07-17 NOTE — TELEPHONE ENCOUNTER
----- Message from Danielle Gutierrez MD sent at 7/16/2018 11:00 PM EDT -----  Please check status of referral  Has she scheduled appt with [address] [place] [address] nephrology?

## 2018-07-17 NOTE — TELEPHONE ENCOUNTER
Patient called today. She said she woke up with kankles and pitting edema. She said she has never woke up with this much swelling before. I suggested she calls her PCP since you are not in the office today.

## 2019-10-22 ENCOUNTER — HOSPITAL ENCOUNTER (EMERGENCY)
Age: 39
Discharge: HOME | End: 2019-10-22
Payer: COMMERCIAL

## 2019-10-22 VITALS
HEIGHT: 65 IN | RESPIRATION RATE: 17 BRPM | TEMPERATURE: 97.9 F | OXYGEN SATURATION: 99 % | WEIGHT: 170 LBS | BODY MASS INDEX: 28.32 KG/M2 | HEART RATE: 78 BPM | SYSTOLIC BLOOD PRESSURE: 122 MMHG | DIASTOLIC BLOOD PRESSURE: 85 MMHG

## 2019-10-22 DIAGNOSIS — F41.1 ANXIETY STATE: ICD-10-CM

## 2019-10-22 DIAGNOSIS — F41.0 PANIC ATTACK: Primary | ICD-10-CM

## 2019-10-22 PROCEDURE — 6360000002 HC RX W HCPCS: Performed by: PHYSICIAN ASSISTANT

## 2019-10-22 PROCEDURE — 99283 EMERGENCY DEPT VISIT LOW MDM: CPT

## 2019-10-22 PROCEDURE — 96372 THER/PROPH/DIAG INJ SC/IM: CPT

## 2019-10-22 RX ORDER — HYDROXYZINE PAMOATE 25 MG/1
25 CAPSULE ORAL 3 TIMES DAILY PRN
Qty: 12 CAPSULE | Refills: 0 | Status: SHIPPED | OUTPATIENT
Start: 2019-10-22 | End: 2019-11-05

## 2019-10-22 RX ORDER — HYDROXYZINE HYDROCHLORIDE 50 MG/ML
50 INJECTION, SOLUTION INTRAMUSCULAR ONCE
Status: COMPLETED | OUTPATIENT
Start: 2019-10-22 | End: 2019-10-22

## 2019-10-22 RX ADMIN — HYDROXYZINE HYDROCHLORIDE 50 MG: 50 INJECTION, SOLUTION INTRAMUSCULAR at 20:03

## 2019-10-22 ASSESSMENT — ENCOUNTER SYMPTOMS
SHORTNESS OF BREATH: 0
BACK PAIN: 0
ABDOMINAL PAIN: 0
RHINORRHEA: 0
VOMITING: 0
NAUSEA: 0
SORE THROAT: 0
WHEEZING: 0
DIARRHEA: 0
EYE PAIN: 0
EYE DISCHARGE: 0
COUGH: 0

## 2019-10-22 ASSESSMENT — SLEEP AND FATIGUE QUESTIONNAIRES
RESTFUL SLEEP: YES
AVERAGE NUMBER OF SLEEP HOURS: 7
DIFFICULTY FALLING ASLEEP: NO
DIFFICULTY STAYING ASLEEP: NO
SLEEP PATTERN: NORMAL
DO YOU USE A SLEEP AID: YES
DO YOU HAVE DIFFICULTY SLEEPING: NO
DIFFICULTY ARISING: NO